# Patient Record
Sex: FEMALE | Race: WHITE | NOT HISPANIC OR LATINO
[De-identification: names, ages, dates, MRNs, and addresses within clinical notes are randomized per-mention and may not be internally consistent; named-entity substitution may affect disease eponyms.]

---

## 2017-09-29 ENCOUNTER — TRANSCRIPTION ENCOUNTER (OUTPATIENT)
Age: 77
End: 2017-09-29

## 2017-11-30 ENCOUNTER — TRANSCRIPTION ENCOUNTER (OUTPATIENT)
Age: 77
End: 2017-11-30

## 2019-04-03 ENCOUNTER — TRANSCRIPTION ENCOUNTER (OUTPATIENT)
Age: 79
End: 2019-04-03

## 2021-10-28 ENCOUNTER — TRANSCRIPTION ENCOUNTER (OUTPATIENT)
Age: 81
End: 2021-10-28

## 2021-12-07 PROBLEM — Z00.00 ENCOUNTER FOR PREVENTIVE HEALTH EXAMINATION: Status: ACTIVE | Noted: 2021-12-07

## 2021-12-09 ENCOUNTER — LABORATORY RESULT (OUTPATIENT)
Age: 81
End: 2021-12-09

## 2021-12-09 ENCOUNTER — APPOINTMENT (OUTPATIENT)
Dept: SURGERY | Facility: CLINIC | Age: 81
End: 2021-12-09
Payer: MEDICARE

## 2021-12-09 VITALS
WEIGHT: 150 LBS | HEART RATE: 93 BPM | HEIGHT: 62 IN | DIASTOLIC BLOOD PRESSURE: 87 MMHG | SYSTOLIC BLOOD PRESSURE: 130 MMHG | BODY MASS INDEX: 27.6 KG/M2

## 2021-12-09 DIAGNOSIS — Z86.79 PERSONAL HISTORY OF OTHER DISEASES OF THE CIRCULATORY SYSTEM: ICD-10-CM

## 2021-12-09 DIAGNOSIS — Z86.73 PERSONAL HISTORY OF TRANSIENT ISCHEMIC ATTACK (TIA), AND CEREBRAL INFARCTION W/OUT RESIDUAL DEFICITS: ICD-10-CM

## 2021-12-09 DIAGNOSIS — Z78.9 OTHER SPECIFIED HEALTH STATUS: ICD-10-CM

## 2021-12-09 DIAGNOSIS — Z86.39 PERSONAL HISTORY OF OTHER ENDOCRINE, NUTRITIONAL AND METABOLIC DISEASE: ICD-10-CM

## 2021-12-09 PROCEDURE — 99203 OFFICE O/P NEW LOW 30 MIN: CPT

## 2021-12-09 PROCEDURE — 10005 FNA BX W/US GDN 1ST LES: CPT

## 2021-12-09 RX ORDER — APIXABAN 5 MG/1
5 TABLET, FILM COATED ORAL
Refills: 0 | Status: ACTIVE | COMMUNITY

## 2021-12-09 RX ORDER — ASPIRIN 81 MG
81 TABLET, DELAYED RELEASE (ENTERIC COATED) ORAL
Refills: 0 | Status: ACTIVE | COMMUNITY

## 2021-12-09 RX ORDER — TRIAMTERENE AND HYDROCHLOROTHIAZIDE 37.5; 25 MG/1; MG/1
CAPSULE ORAL
Refills: 0 | Status: ACTIVE | COMMUNITY

## 2021-12-09 RX ORDER — DILTIAZEM HYDROCHLORIDE 30 MG/1
30 TABLET ORAL
Refills: 0 | Status: ACTIVE | COMMUNITY

## 2021-12-09 RX ORDER — ATORVASTATIN CALCIUM 40 MG/1
40 TABLET, FILM COATED ORAL
Refills: 0 | Status: ACTIVE | COMMUNITY

## 2021-12-09 NOTE — CONSULT LETTER
[Dear  ___] : Dear  [unfilled], [Consult Letter:] : I had the pleasure of evaluating your patient, [unfilled]. [Please see my note below.] : Please see my note below. [Consult Closing:] : Thank you very much for allowing me to participate in the care of this patient.  If you have any questions, please do not hesitate to contact me. [Sincerely,] : Sincerely, [FreeTextEntry2] : Dr. Tay Pretty [FreeTextEntry3] : Cecilia Sanchez MD, FACS\par Assistant Professor of Surgery and Otolaryngology\par [facility]\par

## 2021-12-09 NOTE — HISTORY OF PRESENT ILLNESS
[de-identified] : Patient referred by Dr. Pretty for evaluation of enlarging multinodular goiter.  Patient reports over a 5-year history of thyroid nodules with prior benign biopsy.  Patient reports 1 week ago noted increase in anterior neck mass with hardness.  Patient denies dysphagia, shortness of breath, change in voice or radiation exposure.  Thyroid ultrasound December 7, 2021: Right lobe 5.9 x 3 x 2.6 cm with mid lower nodule measuring 4.5 x 2.6 x 2.6 cm.  Left lobe 6.1 x 3.2 x 2.4 cm with 5.5 x 3.3 x 1.9 cm nodule.  Isthmus nodule measuring 5.7 x 3.1 x 3.9 cm mostly solid. I have reviewed all old and new data and available images.  \par \par

## 2021-12-09 NOTE — HISTORY OF PRESENT ILLNESS
[de-identified] : Patient referred by Dr. Pretty for evaluation of enlarging multinodular goiter.  Patient reports over a 5-year history of thyroid nodules with prior benign biopsy.  Patient reports 1 week ago noted increase in anterior neck mass with hardness.  Patient denies dysphagia, shortness of breath, change in voice or radiation exposure.  Thyroid ultrasound December 7, 2021: Right lobe 5.9 x 3 x 2.6 cm with mid lower nodule measuring 4.5 x 2.6 x 2.6 cm.  Left lobe 6.1 x 3.2 x 2.4 cm with 5.5 x 3.3 x 1.9 cm nodule.  Isthmus nodule measuring 5.7 x 3.1 x 3.9 cm mostly solid. I have reviewed all old and new data and available images.  \par \par

## 2021-12-09 NOTE — ASSESSMENT
[FreeTextEntry1] : Patient with recent enlargement of isthmus nodule.  Ultrasound-guided fine-needle aspiration performed.  If benign cytology, observation will be considered.  I requested a CT scan of the neck to assess substernal extension and tracheal compression.  The patient will contact my office to review results.  If no suspicious findings, follow-up thyroid ultrasound June 2022, RTO 6 months. I have answered their questions to the best of my ability.\par

## 2021-12-09 NOTE — REASON FOR VISIT
[Initial Consultation] : an initial consultation for [FreeTextEntry2] : Enlarging multinodular goiter [Procedure: _________] : a [unfilled] procedure visit [Other: _____] : [unfilled]

## 2021-12-09 NOTE — CONSULT LETTER
[Dear  ___] : Dear  [unfilled], [Consult Letter:] : I had the pleasure of evaluating your patient, [unfilled]. [Please see my note below.] : Please see my note below. [Consult Closing:] : Thank you very much for allowing me to participate in the care of this patient.  If you have any questions, please do not hesitate to contact me. [Sincerely,] : Sincerely, [FreeTextEntry2] : Dr. Tay Pretty [FreeTextEntry3] : Cecilia Sanchez MD, FACS\par Assistant Professor of Surgery and Otolaryngology\par [facility]\par

## 2021-12-09 NOTE — PHYSICAL EXAM
[de-identified] : No cervical or supraclavicular adenopathy, trachea midline, multinodular goiter with substernal extension.  Dominant isthmus nodule 6 cm. [Normal] : no neck adenopathy [de-identified] : Skin:  normal appearance.  no rash, nodules, vesicles, or erythema,\par Musculoskeletal:  full range of motion and no deformities appreciated\par Neurological:  grossly intact\par Psychiatric:  oriented to person, place and time with appropriate affect

## 2021-12-09 NOTE — PHYSICAL EXAM
[de-identified] : No cervical or supraclavicular adenopathy, trachea midline, multinodular goiter with substernal extension.  Dominant isthmus nodule 6 cm. [Normal] : no neck adenopathy [de-identified] : Skin:  normal appearance.  no rash, nodules, vesicles, or erythema,\par Musculoskeletal:  full range of motion and no deformities appreciated\par Neurological:  grossly intact\par Psychiatric:  oriented to person, place and time with appropriate affect

## 2021-12-09 NOTE — PROCEDURE
[FreeTextEntry1] : Ultrasound-guided thyroid FNA [FreeTextEntry2] : Enlarging isthmus nodule 6 cm [FreeTextEntry3] : Patient for thyroid biopsy. Risks benefits and alternatives discussed including bleeding, infection, swelling and need for repeat biopsy. Questions answered.\par Consent obtained, timeout taken.\par \par Patient supine.\par No anesthetic used. \par Nodule localized with US guidance\par Sterile technique with Betadine skin prep.\par 22-gauge needle under ultrasound guidance with a single pass.\par Slides prepared sent to histology.\par \par Post procedure:\par Hemostasis obtained, patient tolerated well, no complications.\par Post procedure instructions given.\par

## 2021-12-16 ENCOUNTER — NON-APPOINTMENT (OUTPATIENT)
Age: 81
End: 2021-12-16

## 2021-12-23 ENCOUNTER — NON-APPOINTMENT (OUTPATIENT)
Age: 81
End: 2021-12-23

## 2022-01-06 ENCOUNTER — NON-APPOINTMENT (OUTPATIENT)
Age: 82
End: 2022-01-06

## 2022-06-14 ENCOUNTER — APPOINTMENT (OUTPATIENT)
Dept: SURGERY | Facility: CLINIC | Age: 82
End: 2022-06-14
Payer: MEDICARE

## 2022-06-14 PROCEDURE — 99213 OFFICE O/P EST LOW 20 MIN: CPT

## 2022-06-14 RX ORDER — NITROFURANTOIN MACROCRYSTALS 100 MG/1
100 CAPSULE ORAL
Qty: 14 | Refills: 0 | Status: DISCONTINUED | COMMUNITY
Start: 2021-12-17

## 2022-06-14 RX ORDER — AMOXICILLIN AND CLAVULANATE POTASSIUM 500; 125 MG/1; MG/1
500-125 TABLET, FILM COATED ORAL
Qty: 14 | Refills: 0 | Status: DISCONTINUED | COMMUNITY
Start: 2022-05-25

## 2022-06-14 NOTE — ASSESSMENT
[FreeTextEntry1] : Patient with recent enlargement of isthmus nodule.  Ultrasound-guided fine-needle aspiration  benign cytology, CT scan of the neck with mild  substernal extension and minimal tracheal compression. , follow-up thyroid ultrasound June 2022 without significant change.  I recommended observation with a follow-up ultrasound December 2022., RTO 6 months. I have answered their questions to the best of my ability.\par

## 2022-06-14 NOTE — HISTORY OF PRESENT ILLNESS
[de-identified] : Patient referred by Dr. Pretty for evaluation of enlarging multinodular goiter.  Patient reports over a 5-year history of thyroid nodules with prior benign biopsy.  Patient reports 1 week ago noted increase in anterior neck mass with hardness.  Patient denies dysphagia, shortness of breath, change in voice or radiation exposure.  Thyroid ultrasound December 7, 2021: Right lobe 5.9 x 3 x 2.6 cm with mid lower nodule measuring 4.5 x 2.6 x 2.6 cm.  Left lobe 6.1 x 3.2 x 2.4 cm with 5.5 x 3.3 x 1.9 cm nodule.  Isthmus nodule measuring 5.7 x 3.1 x 3.9 cm mostly solid.  Neck CT December 2021 with minimal substernal extension, no significant tracheal pressure.  Thyroid ultrasound June 2022: Multiple nodules with without significant change.  Patient denies recent illness.   I have reviewed all old and new data and available images.  \par \par

## 2022-06-14 NOTE — PHYSICAL EXAM
[de-identified] : No cervical or supraclavicular adenopathy, trachea midline, multinodular goiter with substernal extension.  Dominant isthmus nodule 6 cm. [Normal] : no neck adenopathy [de-identified] : Skin:  normal appearance.  no rash, nodules, vesicles, or erythema,\par Musculoskeletal:  full range of motion and no deformities appreciated\par Neurological:  grossly intact\par Psychiatric:  oriented to person, place and time with appropriate affect

## 2022-12-13 ENCOUNTER — APPOINTMENT (OUTPATIENT)
Dept: SURGERY | Facility: CLINIC | Age: 82
End: 2022-12-13

## 2022-12-13 PROCEDURE — 99213 OFFICE O/P EST LOW 20 MIN: CPT

## 2022-12-13 NOTE — HISTORY OF PRESENT ILLNESS
[de-identified] : Patient referred by Dr. Pretty for evaluation of enlarging multinodular goiter.  Patient reports over a 5-year history of thyroid nodules with prior benign biopsy.  Patient reports 1 week ago noted increase in anterior neck mass with hardness.  Patient denies dysphagia, shortness of breath, change in voice or radiation exposure.  Thyroid ultrasound December 7, 2021: Right lobe 5.9 x 3 x 2.6 cm with mid lower nodule measuring 4.5 x 2.6 x 2.6 cm.  Left lobe 6.1 x 3.2 x 2.4 cm with 5.5 x 3.3 x 1.9 cm nodule.  Isthmus nodule measuring 5.7 x 3.1 x 3.9 cm mostly solid.  Neck CT December 2021 with minimal substernal extension, no significant tracheal pressure.  Thyroid ultrasound June 2022: Multiple nodules with without significant change. f/u US 11/2022 with stable nodules.   Patient denies recent illness.   I have reviewed all old and new data and available images.  \par \par

## 2022-12-13 NOTE — PHYSICAL EXAM
[de-identified] : No cervical or supraclavicular adenopathy, trachea midline, multinodular goiter with substernal extension.  Dominant isthmus nodule 6 cm. [Normal] : no neck adenopathy [de-identified] : Skin:  normal appearance.  no rash, nodules, vesicles, or erythema,\par Musculoskeletal:  full range of motion and no deformities appreciated\par Neurological:  grossly intact\par Psychiatric:  oriented to person, place and time with appropriate affect

## 2022-12-13 NOTE — ASSESSMENT
[FreeTextEntry1] : Patient with recent enlargement of isthmus nodule.  Ultrasound-guided fine-needle aspiration  benign cytology, CT scan of the neck with mild  substernal extension and minimal tracheal compression. , follow-up thyroid ultrasound June 2022 without significant change. stable on  follow-up ultrasound December 2022., repeat US 6/2023  RTO 6 months. I have answered their questions to the best of my ability.\par

## 2023-06-13 ENCOUNTER — APPOINTMENT (OUTPATIENT)
Dept: SURGERY | Facility: CLINIC | Age: 83
End: 2023-06-13

## 2023-11-21 ENCOUNTER — APPOINTMENT (OUTPATIENT)
Dept: SURGERY | Facility: CLINIC | Age: 83
End: 2023-11-21
Payer: MEDICARE

## 2023-11-21 DIAGNOSIS — E04.9 NONTOXIC GOITER, UNSPECIFIED: ICD-10-CM

## 2023-11-21 DIAGNOSIS — E04.2 NONTOXIC MULTINODULAR GOITER: ICD-10-CM

## 2023-11-21 PROCEDURE — 99213 OFFICE O/P EST LOW 20 MIN: CPT

## 2023-11-29 ENCOUNTER — NON-APPOINTMENT (OUTPATIENT)
Age: 83
End: 2023-11-29

## 2023-12-07 ENCOUNTER — NON-APPOINTMENT (OUTPATIENT)
Age: 83
End: 2023-12-07

## 2025-05-20 ENCOUNTER — APPOINTMENT (OUTPATIENT)
Age: 85
End: 2025-05-20
Payer: MEDICARE

## 2025-05-20 ENCOUNTER — NON-APPOINTMENT (OUTPATIENT)
Age: 85
End: 2025-05-20

## 2025-05-20 PROCEDURE — 92014 COMPRE OPH EXAM EST PT 1/>: CPT

## 2025-05-20 PROCEDURE — 92133 CPTRZD OPH DX IMG PST SGM ON: CPT

## 2025-05-20 PROCEDURE — 92202 OPSCPY EXTND ON/MAC DRAW: CPT
